# Patient Record
Sex: FEMALE | Race: WHITE | NOT HISPANIC OR LATINO | Employment: OTHER | URBAN - METROPOLITAN AREA
[De-identification: names, ages, dates, MRNs, and addresses within clinical notes are randomized per-mention and may not be internally consistent; named-entity substitution may affect disease eponyms.]

---

## 2018-08-23 DIAGNOSIS — Z00.00 ROUTINE GENERAL MEDICAL EXAMINATION AT A HEALTH CARE FACILITY: Primary | ICD-10-CM

## 2018-10-02 ENCOUNTER — HOSPITAL ENCOUNTER (OUTPATIENT)
Dept: CARDIOLOGY | Facility: CLINIC | Age: 48
Discharge: HOME | End: 2018-10-02
Payer: COMMERCIAL

## 2018-10-02 ENCOUNTER — CLINICAL SUPPORT (OUTPATIENT)
Dept: INTERNAL MEDICINE | Facility: CLINIC | Age: 48
End: 2018-10-02
Payer: COMMERCIAL

## 2018-10-02 ENCOUNTER — HOSPITAL ENCOUNTER (OUTPATIENT)
Dept: RADIOLOGY | Facility: HOSPITAL | Age: 48
Discharge: HOME | End: 2018-10-02
Attending: INTERNAL MEDICINE
Payer: COMMERCIAL

## 2018-10-02 ENCOUNTER — OFFICE VISIT (OUTPATIENT)
Dept: INTERNAL MEDICINE | Facility: CLINIC | Age: 48
End: 2018-10-02
Payer: COMMERCIAL

## 2018-10-02 VITALS
DIASTOLIC BLOOD PRESSURE: 62 MMHG | HEIGHT: 66 IN | OXYGEN SATURATION: 98 % | WEIGHT: 128.06 LBS | HEART RATE: 75 BPM | SYSTOLIC BLOOD PRESSURE: 100 MMHG | BODY MASS INDEX: 20.58 KG/M2

## 2018-10-02 DIAGNOSIS — G89.29 CHRONIC NONINTRACTABLE HEADACHE, UNSPECIFIED HEADACHE TYPE: ICD-10-CM

## 2018-10-02 DIAGNOSIS — Z00.00 ROUTINE GENERAL MEDICAL EXAMINATION AT A HEALTH CARE FACILITY: ICD-10-CM

## 2018-10-02 DIAGNOSIS — G44.89 CHRONIC MIXED HEADACHE SYNDROME: ICD-10-CM

## 2018-10-02 DIAGNOSIS — R92.30 DENSE BREASTS: ICD-10-CM

## 2018-10-02 DIAGNOSIS — R51.9 CHRONIC NONINTRACTABLE HEADACHE, UNSPECIFIED HEADACHE TYPE: ICD-10-CM

## 2018-10-02 DIAGNOSIS — Z00.00 ROUTINE GENERAL MEDICAL EXAMINATION AT A HEALTH CARE FACILITY: Primary | ICD-10-CM

## 2018-10-02 DIAGNOSIS — Z00.00 VISIT FOR ANNUAL HEALTH EXAMINATION: Primary | ICD-10-CM

## 2018-10-02 LAB
ALBUMIN SERPL BCP-MCNC: 3.9 G/DL
ALP SERPL-CCNC: 62 U/L
ALT SERPL W/O P-5'-P-CCNC: 7 U/L
ANION GAP SERPL CALC-SCNC: 6 MMOL/L
AST SERPL-CCNC: 18 U/L
BILIRUB SERPL-MCNC: 0.6 MG/DL
BUN SERPL-MCNC: 6 MG/DL
CALCIUM SERPL-MCNC: 9.2 MG/DL
CHLORIDE SERPL-SCNC: 107 MMOL/L
CHOLEST SERPL-MCNC: 221 MG/DL
CHOLEST/HDLC SERPL: 3.4 {RATIO}
CO2 SERPL-SCNC: 26 MMOL/L
CREAT SERPL-MCNC: 0.8 MG/DL
DIASTOLIC DYSFUNCTION: NO
ERYTHROCYTE [DISTWIDTH] IN BLOOD BY AUTOMATED COUNT: 12.2 %
EST. GFR  (AFRICAN AMERICAN): >60 ML/MIN/1.73 M^2
EST. GFR  (NON AFRICAN AMERICAN): >60 ML/MIN/1.73 M^2
ESTIMATED AVG GLUCOSE: 94 MG/DL
GLUCOSE SERPL-MCNC: 98 MG/DL
HBA1C MFR BLD HPLC: 4.9 %
HCT VFR BLD AUTO: 39.5 %
HDLC SERPL-MCNC: 65 MG/DL
HDLC SERPL: 29.4 %
HGB BLD-MCNC: 13.2 G/DL
LDLC SERPL CALC-MCNC: 134.4 MG/DL
MCH RBC QN AUTO: 31.4 PG
MCHC RBC AUTO-ENTMCNC: 33.4 G/DL
MCV RBC AUTO: 94 FL
NONHDLC SERPL-MCNC: 156 MG/DL
PLATELET # BLD AUTO: 275 K/UL
PMV BLD AUTO: 9.9 FL
POTASSIUM SERPL-SCNC: 3.6 MMOL/L
PROT SERPL-MCNC: 6.8 G/DL
RBC # BLD AUTO: 4.21 M/UL
SODIUM SERPL-SCNC: 139 MMOL/L
TRIGL SERPL-MCNC: 108 MG/DL
TSH SERPL DL<=0.005 MIU/L-ACNC: 3.82 UIU/ML
WBC # BLD AUTO: 4.34 K/UL

## 2018-10-02 PROCEDURE — 84443 ASSAY THYROID STIM HORMONE: CPT

## 2018-10-02 PROCEDURE — 80053 COMPREHEN METABOLIC PANEL: CPT

## 2018-10-02 PROCEDURE — 93015 CV STRESS TEST SUPVJ I&R: CPT | Mod: S$GLB,,, | Performed by: INTERNAL MEDICINE

## 2018-10-02 PROCEDURE — 71046 X-RAY EXAM CHEST 2 VIEWS: CPT | Mod: 26,,, | Performed by: RADIOLOGY

## 2018-10-02 PROCEDURE — 83036 HEMOGLOBIN GLYCOSYLATED A1C: CPT

## 2018-10-02 PROCEDURE — 99999 PR PBB SHADOW E&M-EST. PATIENT-LVL IV: CPT | Mod: PBBFAC,,, | Performed by: INTERNAL MEDICINE

## 2018-10-02 PROCEDURE — 80061 LIPID PANEL: CPT

## 2018-10-02 PROCEDURE — 71046 X-RAY EXAM CHEST 2 VIEWS: CPT | Mod: TC,FY

## 2018-10-02 PROCEDURE — 36415 COLL VENOUS BLD VENIPUNCTURE: CPT

## 2018-10-02 PROCEDURE — 85027 COMPLETE CBC AUTOMATED: CPT

## 2018-10-02 PROCEDURE — 99386 PREV VISIT NEW AGE 40-64: CPT | Mod: S$GLB,,, | Performed by: INTERNAL MEDICINE

## 2018-10-02 NOTE — LETTER
October 4, 2018    Emperatriz Holbrook  [address] [facility] [address]             Fadi Dong - Internal Medicine  [address]  [facility] [address]  Phone: [phone]  Fax: [phone] Dear Ms. Holbrook:    Thank you for allowing me to serve you and perform your Executive Health exam on 10/2/2018.  This letter will serve a brief summary of the history, physical findings, and laboratory/studies performed and recommendations at that time.    Reason for Visit: Executive Health Preventive Physical Examination    Lab Results   Component Value Date    WBC 4.34 10/02/2018    HGB 13.2 10/02/2018    HCT 39.5 10/02/2018    MCV 94 10/02/2018     10/02/2018     CMP  Sodium   Date Value Ref Range Status   10/02/2018 139 136 - 145 mmol/L Final     Potassium   Date Value Ref Range Status   10/02/2018 3.6 3.5 - 5.1 mmol/L Final     Chloride   Date Value Ref Range Status   10/02/2018 107 95 - 110 mmol/L Final     CO2   Date Value Ref Range Status   10/02/2018 26 23 - 29 mmol/L Final     Glucose   Date Value Ref Range Status   10/02/2018 98 70 - 110 mg/dL Final     BUN, Bld   Date Value Ref Range Status   10/02/2018 6 6 - 20 mg/dL Final     Creatinine   Date Value Ref Range Status   10/02/2018 0.8 0.5 - 1.4 mg/dL Final     Calcium   Date Value Ref Range Status   10/02/2018 9.2 8.7 - 10.5 mg/dL Final     Total Protein   Date Value Ref Range Status   10/02/2018 6.8 6.0 - 8.4 g/dL Final     Albumin   Date Value Ref Range Status   10/02/2018 3.9 3.5 - 5.2 g/dL Final     Total Bilirubin   Date Value Ref Range Status   10/02/2018 0.6 0.1 - 1.0 mg/dL Final     Comment:     For infants and newborns, interpretation of results should be based  on gestational age, weight and in agreement with clinical  observations.  Premature Infant recommended reference ranges:  Up to 24 hours.............<8.0 mg/dL  Up to 48 hours............<12.0 mg/dL  3-5 days..................<15.0 mg/dL  6-29 days.................<15.0  mg/dL       Alkaline Phosphatase   Date Value Ref Range Status   10/02/2018 62 55 - 135 U/L Final     AST   Date Value Ref Range Status   10/02/2018 18 10 - 40 U/L Final     ALT   Date Value Ref Range Status   10/02/2018 7 (L) 10 - 44 U/L Final     Anion Gap   Date Value Ref Range Status   10/02/2018 6 (L) 8 - 16 mmol/L Final     eGFR if    Date Value Ref Range Status   10/02/2018 >60.0 >60 mL/min/1.73 m^2 Final     eGFR if non    Date Value Ref Range Status   10/02/2018 >60.0 >60 mL/min/1.73 m^2 Final     Comment:     Calculation used to obtain the estimated glomerular filtration  rate (eGFR) is the CKD-EPI equation.        Lab Results   Component Value Date    LDLCALC 134.4 10/02/2018     Lab Results   Component Value Date    TSH 3.821 10/02/2018     Lab Results   Component Value Date    HGBA1C 4.9 10/02/2018     Chest x-ray normal  Exercise stress test negative for ischemia    Assessment/Recommendations:  Routine Health Maintenance  Flu and Tdap vaccinations given on day of appointment.  Pap recommended via gynecology, referral placed.  Declined mammogram due to issues with pain and dense breasts in the past; ultrasound ordered but patient advised to call her insurance to make sure it is covered.    At this time, you appear to be in good medical condition.  MRI and MRA brain ordered due to chronic pulsatile sensation and headaches, which is not covered as part of your Executive Health package and will be billed based on your deductible from your health insurance.  I look forward to seeing you again next year.  Please contact me should you have any questions   or concerns regarding physical findings, or my recommendations.    If you have any questions or concerns, please don't hesitate to call.    Sincerely,        Jil Doan MD

## 2018-10-02 NOTE — PROGRESS NOTES
INTERNAL MEDICINE INITIAL VISIT NOTE      CHIEF COMPLAINT     Chief Complaint   Patient presents with    [facility]       HPI     Emperatriz Holbrook is a 48 y.o.  female who presents with  No significant PMHx, here today for [facility] physical and to establish care.    Today also c c/o pulsating sensation on the top of the head present for the past two mos or so.  Does not appear positional.  Pulsation not palpable.  Also reports recent headaches for the past few months.  No vision changes.  Denies focal deficits.  No associated n/v.      Past Medical History:  History reviewed. No pertinent past medical history.    Past Surgical History:  History reviewed. No pertinent surgical history.    Home Medications:  Prior to Admission medications    Not on File       Allergies:  Review of patient's allergies indicates:  No Known Allergies    Family History:  Family History   Problem Relation Age of Onset    No Known Problems Mother     No Known Problems Father     Heart disease Maternal Grandmother          in her late 60s    Stroke Maternal Grandmother     Alzheimer's disease Paternal Grandmother        Social History:  Social History     Tobacco Use    Smoking status: Never Smoker    Smokeless tobacco: Never Used   Substance Use Topics    Alcohol use: Yes     Comment: 1-2 drinks/night    Drug use: No       Review of Systems:  Review of Systems   Constitutional: Negative for appetite change, chills, fatigue, fever and unexpected weight change.   HENT: Negative for congestion, hearing loss and rhinorrhea.    Eyes: Negative for pain and visual disturbance.   Respiratory: Negative for cough, chest tightness, shortness of breath and wheezing.    Cardiovascular: Negative for chest pain, palpitations and leg swelling.   Gastrointestinal: Negative for abdominal distention and abdominal pain.   Endocrine: Negative for polydipsia and polyuria.   Genitourinary: Negative for decreased urine  "volume, difficulty urinating, dysuria, hematuria and vaginal discharge.   Neurological: Positive for headaches. Negative for weakness and numbness.   Psychiatric/Behavioral: Negative for behavioral problems and confusion.       Health Maintenance:   Immunizations:   Influenza today  TDap today  Prevnar rec at 65  Shingrix rec at 50    Cancer Screening:  PAP: last done in [place], due for f/u now, will refer to gyn  Mammogram:  Reports attempted in the past but intolerant due to pain and reports hx dense breasts, agreed to B u/s as she has refused to retry mmg  Colonoscopy: rec at 50      PHYSICAL EXAM     /62 (BP Location: Right arm, Patient Position: Sitting, BP Method: Medium (Manual))   Pulse 75   Ht 5' 6" (1.676 m)   Wt 58.1 kg (128 lb 1.4 oz)   SpO2 98%   BMI 20.67 kg/m²     GEN - A+OX4, NAD   HEENT - PERRL, EOMI, OP clear; no pulsation appreciated on scalp at location of complaint.  Neck - No thyromegaly or cervical LAD. No thyroid masses felt.  CV - RRR, no m/r/g  Chest - CTAB, no wheezing, crackles, or rhonchi  Abd - S/NT/ND/+BS.   Ext - 2+BDP and radial pulses. No C/C/E.  Neuro - 5/5 BUE and BLE strength.  LN - No LAD appreciated.  Skin - Normal color and texture, no rash, no skin lesions.      LABS     Lab Results   Component Value Date    WBC 4.34 10/02/2018    HGB 13.2 10/02/2018    HCT 39.5 10/02/2018    MCV 94 10/02/2018     10/02/2018     CMP  Sodium   Date Value Ref Range Status   10/02/2018 139 136 - 145 mmol/L Final     Potassium   Date Value Ref Range Status   10/02/2018 3.6 3.5 - 5.1 mmol/L Final     Chloride   Date Value Ref Range Status   10/02/2018 107 95 - 110 mmol/L Final     CO2   Date Value Ref Range Status   10/02/2018 26 23 - 29 mmol/L Final     Glucose   Date Value Ref Range Status   10/02/2018 98 70 - 110 mg/dL Final     BUN, Bld   Date Value Ref Range Status   10/02/2018 6 6 - 20 mg/dL Final     Creatinine   Date Value Ref Range Status   10/02/2018 0.8 0.5 - 1.4 " mg/dL Final     Calcium   Date Value Ref Range Status   10/02/2018 9.2 8.7 - 10.5 mg/dL Final     Total Protein   Date Value Ref Range Status   10/02/2018 6.8 6.0 - 8.4 g/dL Final     Albumin   Date Value Ref Range Status   10/02/2018 3.9 3.5 - 5.2 g/dL Final     Total Bilirubin   Date Value Ref Range Status   10/02/2018 0.6 0.1 - 1.0 mg/dL Final     Comment:     For infants and newborns, interpretation of results should be based  on gestational age, weight and in agreement with clinical  observations.  Premature Infant recommended reference ranges:  Up to 24 hours.............<8.0 mg/dL  Up to 48 hours............<12.0 mg/dL  3-5 days..................<15.0 mg/dL  6-29 days.................<15.0 mg/dL       Alkaline Phosphatase   Date Value Ref Range Status   10/02/2018 62 55 - 135 U/L Final     AST   Date Value Ref Range Status   10/02/2018 18 10 - 40 U/L Final     ALT   Date Value Ref Range Status   10/02/2018 7 (L) 10 - 44 U/L Final     Anion Gap   Date Value Ref Range Status   10/02/2018 6 (L) 8 - 16 mmol/L Final     eGFR if    Date Value Ref Range Status   10/02/2018 >60.0 >60 mL/min/1.73 m^2 Final     eGFR if non    Date Value Ref Range Status   10/02/2018 >60.0 >60 mL/min/1.73 m^2 Final     Comment:     Calculation used to obtain the estimated glomerular filtration  rate (eGFR) is the CKD-EPI equation.        Lab Results   Component Value Date    LDLCALC 134.4 10/02/2018     Lab Results   Component Value Date    HGBA1C 4.9 10/02/2018     Lab Results   Component Value Date    TSH 3.821 10/02/2018       ASSESSMENT/PLAN     Emperatriz Holbrook is a 48 y.o. female with  [facility]peratriz [facility].    Diagnoses and all orders for this visit:    Visit for annual health examination  All labs reviewed c pt, all normal x borderline cholesterol but at goal for age, counseled on diet modifications.  CXR wnl  Stress test neg for ischemia  -     Ambulatory referral to  Gynecology    Chronic nonintractable headache, unspecified headache  type  Chronic mixed headache syndrome  Also c associated pulsatile sensation at the top of her head  Based on sx c assoc h/a and chronicity s improvement, will check CT head and CTA  Advised pt that this is not including in EH physical, pt understands it will be billed through her insurance and that her payment would be based on her deductible.  -     CT Head W Wo Contrast; Future; Expected date: 10/02/2018  -     CTA Head; Future; Expected date: 10/02/2018    Dense breasts  As per HPI  Intolerant of mmg per hx  Advised to check c insurance first regarding coverage  -     US Breast Bilateral Complete; Future; Expected date: 10/02/2018        HM as above    RTC in 12 months, sooner if needed and depending on imaging and sx.    Jil Doan MD  Department of Internal Medicine - [facility]  10/02/2018

## 2018-10-03 ENCOUNTER — TELEPHONE (OUTPATIENT)
Dept: INTERNAL MEDICINE | Facility: CLINIC | Age: 48
End: 2018-10-03

## 2018-10-03 NOTE — TELEPHONE ENCOUNTER
----- Message from Merle Cabral, RT sent at 10/3/2018  1:38 PM CDT -----  Good afternoon, I have this pt on my schedule at [facility] in [place] for tomorrow and think that her test was scheduled here on accident being that she has another CT and US scheduled at [facility] before and after her appt time here. Can someone look into this? thank you:)

## 2018-10-04 ENCOUNTER — TELEPHONE (OUTPATIENT)
Dept: INTERNAL MEDICINE | Facility: CLINIC | Age: 48
End: 2018-10-04

## 2018-10-04 ENCOUNTER — HOSPITAL ENCOUNTER (OUTPATIENT)
Dept: RADIOLOGY | Facility: HOSPITAL | Age: 48
Discharge: HOME | End: 2018-10-04
Attending: INTERNAL MEDICINE
Payer: COMMERCIAL

## 2018-10-04 DIAGNOSIS — G44.89 CHRONIC MIXED HEADACHE SYNDROME: ICD-10-CM

## 2018-10-04 DIAGNOSIS — R92.30 DENSE BREASTS: ICD-10-CM

## 2018-10-04 PROCEDURE — 76641 ULTRASOUND BREAST COMPLETE: CPT | Mod: 26,RT,, | Performed by: RADIOLOGY

## 2018-10-04 PROCEDURE — 76641 ULTRASOUND BREAST COMPLETE: CPT | Mod: TC,50,PO

## 2018-10-04 PROCEDURE — 76641 ULTRASOUND BREAST COMPLETE: CPT | Mod: 26,LT,, | Performed by: RADIOLOGY

## 2018-10-04 NOTE — TELEPHONE ENCOUNTER
----- Message from Carito Monahan MD sent at 10/4/2018 10:50 AM CDT -----  Good morning,     I asked Dr. Santiago about the study and he recommends MRI/MRA brain instead.     Thanks,   Carito     ----- Message -----  From: Jil Doan MD  Sent: 10/4/2018   8:29 AM  To: MD Mason Terrazas, sorry, she was describing this pulsation deep in her head (nothing palpable on exam) with chronic headache for the past two mos.    Wanted to make sure she didn't have an aneurysm or something else causing her headache.    Would MRI/MRA be better?  I figured CT was cheaper.  Feel free to call my cell at [phone].  Johnny Little is my .    Jil Doan    ----- Message -----  From: Carito Monahan MD  Sent: 10/3/2018   5:06 PM  To: Jil Doan MD    Hi,     I am contacting you regarding the patient's CT head and CTA head that is scheduled for tomorrow. I wanted to get more information about the reason for the exam if possible to better protocol it. I went through your note and wasn't sure about it.       Thanks.   Carito Monahan MD   Radiology resident.

## 2018-10-04 NOTE — TELEPHONE ENCOUNTER
Basilia Ley Staff             Authorization is needing Peer to Peer for MRA (MRI is approved).  Please call [phone] option 3 with case # [id number].  Please advise if the MRA is Medically Urgent or if it can potentially be rescheduled by dr's office until approved.  Thanks for your help.  Sonya

## 2018-10-04 NOTE — TELEPHONE ENCOUNTER
Left message for pt to call me back about imaging test. Radiology wants to cancel  CT today and schedule an mri and mra.

## 2018-10-05 ENCOUNTER — TELEPHONE (OUTPATIENT)
Dept: INTERNAL MEDICINE | Facility: CLINIC | Age: 48
End: 2018-10-05

## 2018-10-05 ENCOUNTER — HOSPITAL ENCOUNTER (OUTPATIENT)
Dept: RADIOLOGY | Facility: HOSPITAL | Age: 48
Discharge: HOME | End: 2018-10-05
Attending: INTERNAL MEDICINE
Payer: COMMERCIAL

## 2018-10-05 DIAGNOSIS — G44.89 CHRONIC MIXED HEADACHE SYNDROME: ICD-10-CM

## 2018-10-05 PROCEDURE — 70551 MRI BRAIN STEM W/O DYE: CPT | Mod: TC

## 2018-10-05 PROCEDURE — 70551 MRI BRAIN STEM W/O DYE: CPT | Mod: 26,,, | Performed by: RADIOLOGY

## 2018-10-05 NOTE — TELEPHONE ENCOUNTER
----- Message from Abi Conteh sent at 10/5/2018  1:18 PM CDT -----  Contact: self/[phone]  Patient is returning a phone call.  Who left a message for the patient: Yael  Does patient know what this is regarding:  no  Comments: please leave a message. Patient is asking for a direct phone number where she can reach the ma. thank you!!!

## 2018-10-05 NOTE — LETTER
October 5, 2018    Emperatriz Holbrook  [address] [facility] [address]             Fadi jac - Internal Medicine  [address]  [facility] [address]  Phone: [phone]  Fax: [phone] Dear Mrs. Holbrook:    Below are the results from your recent visit:    Your results are overall unremarkable MRI brain.  Awaiting MRA.  Please don't hesitate to call our office if you have any questions or concerns.    Sincerely,        Yael Quan MA

## 2018-10-05 NOTE — TELEPHONE ENCOUNTER
Peer to Peer is needed to get approved, please advise if this will be done or if I should deny.  Thanks Sonya

## 2018-10-05 NOTE — TELEPHONE ENCOUNTER
----- Message from Jil Doan MD sent at 10/5/2018  9:00 AM CDT -----  Overall unremarkable MRI brain.  Awaiting MRA.  MA to notify pt of normal results.

## 2018-10-05 NOTE — TELEPHONE ENCOUNTER
----- Message from Bess Gilliam sent at 10/5/2018  1:00 PM CDT -----  Contact: patient [phone]  Pt is returning a call to Yael

## 2018-10-06 ENCOUNTER — PATIENT MESSAGE (OUTPATIENT)
Dept: INTERNAL MEDICINE | Facility: CLINIC | Age: 48
End: 2018-10-06

## 2018-10-08 ENCOUNTER — TELEPHONE (OUTPATIENT)
Dept: INTERNAL MEDICINE | Facility: CLINIC | Age: 48
End: 2018-10-08

## 2018-10-08 DIAGNOSIS — G44.89 CHRONIC MIXED HEADACHE SYNDROME: ICD-10-CM

## 2018-10-08 NOTE — TELEPHONE ENCOUNTER
Basilia Ley Staff             This referral is being denied as Peer to Peer was completed and MRA was approved; however, approved for without contrast cpt 35964.  This referral is for with contrast cpt 29772, patient did not have MRA done.  If patient is rescheduled the auth is approved through 11/19/2018 please use cpt 19716 without contrast.  Thanks Sonya

## 2018-10-08 NOTE — TELEPHONE ENCOUNTER
Basilia Ley Staff             MRA without contrast has been approved cpt code 90324, in order for me to approve a new referral will need to be assigned to this one and this referral unassigned.  I can not approve this referral as it is for with contrast.  Thanks Sonya

## 2018-10-09 ENCOUNTER — TELEPHONE (OUTPATIENT)
Dept: INTERNAL MEDICINE | Facility: CLINIC | Age: 48
End: 2018-10-09

## 2018-10-23 ENCOUNTER — TELEPHONE (OUTPATIENT)
Dept: OBSTETRICS AND GYNECOLOGY | Facility: CLINIC | Age: 48
End: 2018-10-23

## 2018-10-23 ENCOUNTER — OFFICE VISIT (OUTPATIENT)
Dept: OBSTETRICS AND GYNECOLOGY | Facility: CLINIC | Age: 48
End: 2018-10-23
Payer: COMMERCIAL

## 2018-10-23 VITALS
HEIGHT: 66 IN | SYSTOLIC BLOOD PRESSURE: 100 MMHG | DIASTOLIC BLOOD PRESSURE: 70 MMHG | BODY MASS INDEX: 20.57 KG/M2 | WEIGHT: 128 LBS

## 2018-10-23 DIAGNOSIS — N95.1 PERIMENOPAUSE: ICD-10-CM

## 2018-10-23 DIAGNOSIS — Z01.419 WELL WOMAN EXAM WITH ROUTINE GYNECOLOGICAL EXAM: Primary | ICD-10-CM

## 2018-10-23 PROCEDURE — 99999 PR PBB SHADOW E&M-EST. PATIENT-LVL III: CPT | Mod: PBBFAC,,, | Performed by: NURSE PRACTITIONER

## 2018-10-23 PROCEDURE — 88175 CYTOPATH C/V AUTO FLUID REDO: CPT

## 2018-10-23 PROCEDURE — 99386 PREV VISIT NEW AGE 40-64: CPT | Mod: S$GLB,,, | Performed by: NURSE PRACTITIONER

## 2018-10-23 NOTE — LETTER
October 23, 2018      Jil Doan MD  [address]  [facility] [address]           Fadi Dong - OB/GYN 5th Floor  [address]  [facility] [address]  Phone: [phone]          Patient: Emperatriz Holbrook   MR Number: [id number]   YOB: 1970   Date of Visit: 10/23/2018       Dear Dr. Rojas:    Thank you for referring Emperatriz Holbrook to me for evaluation. Attached you will find relevant portions of my assessment and plan of care.    If you have questions, please do not hesitate to call me. I look forward to following Emperatriz Holbrook along with you.    Sincerely,    TERELL Arellano NP    Enclosure  CC:  No Recipients    If you would like to receive this communication electronically, please contact [email or web address] or [phone] to request more information on [employer] access.    For providers and/or their staff who would like to refer a patient to [facility], please contact us through our one-stop-shop provider referral line, [facility], at [phone].    If you feel you have received this communication in error or would no longer like to receive these types of communications, please e-mail [email or web address]

## 2018-10-23 NOTE — PROGRESS NOTES
"HISTORY OF PRESENT ILLNESS:    Emperatriz Holbrook is a 48 y.o. female, ., Patient's last menstrual period was 10/04/2018.,  presents for a routine exam and has no gyn complaints.  -Here to establish care; recently moved to [place] For 's job.    PAST HISTORY:  History reviewed. No pertinent past medical history.  History reviewed. No pertinent surgical history.    MEDICATIONS AND ALLERGIES:  No current outpatient medications on file.  Review of patient's allergies indicates:  No Known Allergies    Family History   Problem Relation Age of Onset    No Known Problems Mother     No Known Problems Father     Heart disease Maternal Grandmother          in her late 60s    Stroke Maternal Grandmother     Alzheimer's disease Paternal Grandmother        Social History     Socioeconomic History    Marital status:      Spouse name: Not on file    Number of children: Not on file    Years of education: Not on file    Highest education level: Not on file   Social Needs    Financial resource strain: Not on file    Food insecurity - worry: Not on file    Food insecurity - inability: Not on file    Transportation needs - medical: Not on file    Transportation needs - non-medical: Not on file   Occupational History    Not on file   Tobacco Use    Smoking status: Never Smoker    Smokeless tobacco: Never Used   Substance and Sexual Activity    Alcohol use: Yes     Comment: 1-2 drinks/night    Drug use: No    Sexual activity: Yes     Partners: Male     Birth control/protection: Partner-Vasectomy   Other Topics Concern    Not on file   Social History Narrative    Not on file       OB HISTORY: None    COMPREHENSIVE GYN HISTORY:  PAP History: Reports abnormal Pap: in her 20's, Treatment: Colposcopy. All paps since negative. DATE OF LAST PAP UNKNOWN "NEG".  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 Denies PID.  Benign History: Denies uterine fibroids. Denies ovarian cysts. Denies endometriosis. Denies " "other conditions.  Cancer History: Denies cervical cancer. Denies uterine cancer or hyperplasia. Denies ovarian cancer. Denies vulvar cancer or pre-cancer. Denies vaginal cancer or pre-cancer. Denies breast cancer. Denies colon cancer.  Sexual Activity History: Reports currently being sexually active  Menstrual History: Monthly. Mod then light flow.   Dysmenorrhea History: Reports mild dysmenorrhea.   Contraception:  with vasectomy.    ROS:  GENERAL: No weight changes. No swelling. No fatigue. No fever.  CARDIOVASCULAR: No chest pain. No shortness of breath. No leg cramps.   NEUROLOGICAL: No headaches. No vision changes.  BREASTS: No pain. No lumps. No discharge.  ABDOMEN: No pain. No nausea. No vomiting. No diarrhea. No constipation.  REPRODUCTIVE: No abnormal bleeding.   VULVA: No pain. No lesions. No itching.  VAGINA: No relaxation. No itching. No odor. No discharge. No lesions.  URINARY: No incontinence. No nocturia. No frequency. No dysuria.    /70   Ht 5' 6" (1.676 m)   Wt 58.1 kg (128 lb)   LMP 10/04/2018   BMI 20.66 kg/m²     PE:  APPEARANCE: Well nourished, well developed, in no acute distress.  AFFECT: WNL, alert and oriented x 3.  SKIN: No acne or hirsutism.  NECK: Neck symmetric, without masses or thyromegaly.  NODES: No inguinal, cervical, axillary or femoral lymph node enlargement.  CHEST: Good respiratory effort.   ABDOMEN: Soft. No tenderness or masses.  BREASTS: Symmetrical, no skin changes, visible lesions, palpable masses or nipple discharge bilaterally.  PELVIC: External female genitalia without lesions.  Female hair distribution. Adequate perineal body, Normal urethral meatus. Vagina moist and well rugated without lesions or discharge.  No significant cystocele or rectocele present. Cervix pink without lesions, discharge or tenderness. Uterus is 4-6 week size, regular, mobile and nontender. Adnexa without masses or tenderness.  EXTREMITIES: No edema    DIAGNOSIS:  1. Well woman " exam with routine gynecological exam    2. Perimenopause        PLAN:    Orders Placed This Encounter    Liquid-based pap smear, screening   Up to date on mammogram (had breast ultrasound instead)    COUNSELING:  The patient was counseled today on:  -perimenopause vs menopause and to report severe vasomotor symptoms and/or skipping periods, heavy, prolonged bleeding, spotting in between periods;  -A.C.S. Pap and pelvic exam guidelines (pap every 3 years), recomendations for yearly mammogram;  -to follow up with her PCP for other health maintenance.    FOLLOW-UP with me in one year and Dr Kaminski in two years.

## 2019-10-16 ENCOUNTER — PATIENT MESSAGE (OUTPATIENT)
Dept: INTERNAL MEDICINE | Facility: CLINIC | Age: 49
End: 2019-10-16

## 2019-10-16 ENCOUNTER — PATIENT MESSAGE (OUTPATIENT)
Dept: ADMINISTRATIVE | Facility: HOSPITAL | Age: 49
End: 2019-10-16

## 2020-01-30 ENCOUNTER — PATIENT OUTREACH (OUTPATIENT)
Dept: ADMINISTRATIVE | Facility: HOSPITAL | Age: 50
End: 2020-01-30

## 2020-06-09 DIAGNOSIS — Z12.11 COLON CANCER SCREENING: ICD-10-CM

## 2021-04-28 ENCOUNTER — PATIENT MESSAGE (OUTPATIENT)
Dept: RESEARCH | Facility: HOSPITAL | Age: 51
End: 2021-04-28

## 2023-09-13 ENCOUNTER — OFFICE VISIT (OUTPATIENT)
Dept: URBAN - METROPOLITAN AREA CLINIC 92 | Facility: CLINIC | Age: 53
End: 2023-09-13